# Patient Record
Sex: FEMALE | Race: WHITE | NOT HISPANIC OR LATINO | Employment: UNEMPLOYED | URBAN - METROPOLITAN AREA
[De-identification: names, ages, dates, MRNs, and addresses within clinical notes are randomized per-mention and may not be internally consistent; named-entity substitution may affect disease eponyms.]

---

## 2021-10-18 ENCOUNTER — OFFICE VISIT (OUTPATIENT)
Dept: ENDOCRINOLOGY | Facility: CLINIC | Age: 66
End: 2021-10-18

## 2021-10-18 VITALS
OXYGEN SATURATION: 98 % | SYSTOLIC BLOOD PRESSURE: 140 MMHG | DIASTOLIC BLOOD PRESSURE: 82 MMHG | HEIGHT: 68 IN | HEART RATE: 80 BPM | BODY MASS INDEX: 30.83 KG/M2 | WEIGHT: 203.4 LBS

## 2021-10-18 DIAGNOSIS — E03.9 ACQUIRED HYPOTHYROIDISM: ICD-10-CM

## 2021-10-18 DIAGNOSIS — E78.00 HYPERCHOLESTEREMIA: ICD-10-CM

## 2021-10-18 DIAGNOSIS — E11.65 UNCONTROLLED TYPE 2 DIABETES MELLITUS WITH HYPERGLYCEMIA (HCC): Primary | ICD-10-CM

## 2021-10-18 PROCEDURE — 99204 OFFICE O/P NEW MOD 45 MIN: CPT | Performed by: INTERNAL MEDICINE

## 2021-10-18 RX ORDER — MONTELUKAST SODIUM 10 MG/1
TABLET ORAL
COMMUNITY

## 2021-10-18 RX ORDER — LEVOTHYROXINE SODIUM 88 MCG
88 TABLET ORAL DAILY
Qty: 30 TABLET | Refills: 11 | Status: SHIPPED | OUTPATIENT
Start: 2021-10-18 | End: 2023-03-30

## 2021-10-18 RX ORDER — LISINOPRIL 5 MG/1
TABLET ORAL
COMMUNITY
Start: 2021-10-14 | End: 2023-03-08

## 2021-10-18 RX ORDER — FLUTICASONE PROPIONATE 50 MCG
SPRAY, SUSPENSION (ML) NASAL
COMMUNITY

## 2021-10-18 RX ORDER — ERGOCALCIFEROL 1.25 MG/1
CAPSULE ORAL
COMMUNITY
End: 2023-03-08

## 2021-10-18 RX ORDER — LEVOTHYROXINE SODIUM 75 MCG
TABLET ORAL
COMMUNITY
Start: 2021-10-12 | End: 2021-10-18 | Stop reason: DRUGHIGH

## 2021-10-18 RX ORDER — GLIMEPIRIDE 2 MG/1
2 TABLET ORAL EVERY MORNING
Qty: 30 TABLET | Refills: 11 | Status: SHIPPED | OUTPATIENT
Start: 2021-10-18 | End: 2023-03-08 | Stop reason: ALTCHOICE

## 2021-10-18 RX ORDER — METFORMIN HYDROCHLORIDE 750 MG/1
TABLET, EXTENDED RELEASE ORAL
COMMUNITY
Start: 2021-09-03 | End: 2023-03-08

## 2021-10-18 RX ORDER — ALBUTEROL SULFATE 90 UG/1
AEROSOL, METERED RESPIRATORY (INHALATION)
COMMUNITY
End: 2023-03-08

## 2021-10-18 RX ORDER — ALBUTEROL SULFATE 2.5 MG/3ML
SOLUTION RESPIRATORY (INHALATION)
COMMUNITY
End: 2023-03-08

## 2021-10-18 RX ORDER — GLIMEPIRIDE 1 MG/1
TABLET ORAL
COMMUNITY
Start: 2021-10-14 | End: 2021-10-18

## 2021-10-18 RX ORDER — METOPROLOL TARTRATE 50 MG/1
TABLET, FILM COATED ORAL
COMMUNITY
End: 2023-03-08

## 2021-10-18 RX ORDER — EZETIMIBE 10 MG/1
10 TABLET ORAL DAILY
Qty: 30 TABLET | Refills: 11 | Status: SHIPPED | OUTPATIENT
Start: 2021-10-18 | End: 2023-03-30

## 2021-10-18 RX ORDER — PANTOPRAZOLE SODIUM 40 MG/1
40 TABLET, DELAYED RELEASE ORAL DAILY PRN
COMMUNITY

## 2021-10-18 NOTE — PROGRESS NOTES
"     Office Note      Date: 10/18/2021  Patient Name: Lucie Mc  MRN: [id number]  : 1955    Chief Complaint   Patient presents with   • Diabetes   • Hyperthyroidism       History of Present Illness:   Lucie Mc is a 65 y.o. female who presents for Diabetes - AND THYROID DISEASE  SHE IS SEEN AS A NEW PATIENT TODAY.  -------------------------------------------------------  SHE IS MOST CONCERNED ABOUT HER THYROID.  SHE HAS HAD HYPOTHYROIDISM SINCE ABOUT .  IN  HER TSH WAS 4.  WHEN SHE WAS 88 MCG PER DAY. BASED UPON THAT RESULT, SHE SAYS HER DOSE WAS REDUCED TO 25 MCG PER DAY.. THEN IN September HER TSH WAS 19. THEN HER DOSE WAS INCREASED TO 50. THEN HER DOSE WAS INCREASED TO 75 A WEEK AGO.  SHE HAS BEEN BOTHERED BY FATIGUE, HAIR LOSS AND DEPRESSION.  -------------------------------------------------------------------------    SHE  WAS DIAGNOSED WITH DIABETES  ABOUT YEARS AGO.  SHE  IS ON METFORMIN AND SFU.  SHE  HAS BEEN ON METFORMIN FOR 3 YEARS . GLIMEPIRIDE  WAS  ADDED THE FIRST OF September AND  SHE HAS NOTED A LITTLE IMPROVEMENT.     Last A1c:  8 SOMETHING       Subjective      Diabetic Complications:  Eyes: No  Kidneys: No  Feet: Yes, describe: HAS SOME TINGLING  Heart: No    ASSOCIATED CONDITIONS-  OBESITY, HLP- DECLINES STATINS AND WANTS TO TAKE ZETIA, HTN    Diet and Exercise:  Meals per day: 1  Minutes of exercise per week: NOT MUCH     Review of Systems:   Review of Systems   Constitutional: Positive for fatigue and unexpected weight change.   Musculoskeletal: Positive for back pain.   Skin:        HAIR LOSS        The following portions of the patient's history were reviewed and updated as appropriate: allergies, current medications, past family history, past medical history, past social history, past surgical history and problem list.    Objective     Visit Vitals  /82 (BP Location: Left arm, Patient Position: Sitting, Cuff Size: Adult)   Pulse 80   Ht 172.7 cm (68\")   Wt " 92.3 kg (203 lb 6.4 oz)   SpO2 98%   BMI 30.93 kg/m²       Labs:    CMP  No results found for: GLUCOSE, BUN, CREATININE, EGFRIFNONA, EGFRIFAFRI, BCR, K, CO2, CALCIUM, PROTENTOTREF, LABIL2, BILIRUBIN, AST, ALT     CBC w/DIFF  No results found for: WBC, RBC, HGB, HCT, MCV, MCH, MCHC, RDW, RDWSD, MPV, PLT, NEUTRORELPCT, LYMPHORELPCT, MONORELPCT, EOSRELPCT, BASORELPCT, AUTOIGPER, NEUTROABS, LYMPHSABS, MONOSABS, EOSABS, BASOSABS, AUTOIGNUM, NRBC    Physical Exam:  Physical Exam  Vitals reviewed.   Constitutional:       Appearance: Normal appearance. She is normal weight.   HENT:      Head: Normocephalic and atraumatic.   Neck:      Comments: No goiter  Cardiovascular:      Rate and Rhythm: Normal rate and regular rhythm.      Pulses:           Dorsalis pedis pulses are 2+ on the right side and 2+ on the left side.        Posterior tibial pulses are 2+ on the right side and 2+ on the left side.   Musculoskeletal:      Right foot: Normal range of motion. No deformity, bunion, Charcot foot, foot drop or prominent metatarsal heads.      Left foot: Normal range of motion. No deformity, bunion, Charcot foot, foot drop or prominent metatarsal heads.   Feet:      Right foot:      Protective Sensation: 5 sites tested. 5 sites sensed.      Skin integrity: Skin integrity normal.      Toenail Condition: Right toenails are normal.      Left foot:      Protective Sensation: 5 sites tested. 5 sites sensed.      Skin integrity: Skin integrity normal.      Toenail Condition: Left toenails are normal.      Comments: Diabetic Foot Exam Performed and Monofilament Test Performed    Lymphadenopathy:      Cervical: No cervical adenopathy.   Skin:     General: Skin is warm.   Neurological:      Mental Status: She is alert.   Psychiatric:         Mood and Affect: Mood normal.         Thought Content: Thought content normal.         Judgment: Judgment normal.         Assessment / Plan      Assessment & Plan:  Problem List Items Addressed This  Visit        Other    Uncontrolled type 2 diabetes mellitus with hyperglycemia (HCC) - Primary    Current Assessment & Plan      Reviewed diet and exercise guidelines with her  Reviewed medication options. She wants to increase the glimepiride  While watching for wt gain and hypoglycemia.         Relevant Medications    metFORMIN ER (GLUCOPHAGE-XR) 750 MG 24 hr tablet    glimepiride (Amaryl) 2 MG tablet    Acquired hypothyroidism    Current Assessment & Plan     Based upon review of her labs, I think she needs to be on at least 88 mcg per day. Will put her back on that then recheck her tsh in 6 weeks.          Relevant Medications    metoprolol tartrate (LOPRESSOR) 50 MG tablet    Synthroid 88 MCG tablet    Other Relevant Orders    TSH    Hypercholesteremia    Current Assessment & Plan      She ought to be on a statin but declines. Agrees to zetia         Relevant Medications    ezetimibe (Zetia) 10 MG tablet           Benny Malhotra MD   10/18/2021

## 2022-10-26 DIAGNOSIS — E03.9 ACQUIRED HYPOTHYROIDISM: ICD-10-CM

## 2022-10-26 RX ORDER — LEVOTHYROXINE SODIUM 88 MCG
TABLET ORAL
Qty: 30 TABLET | Refills: 11 | OUTPATIENT
Start: 2022-10-26

## 2023-02-27 ENCOUNTER — APPOINTMENT (OUTPATIENT)
Dept: GENERAL RADIOLOGY | Facility: HOSPITAL | Age: 68
End: 2023-02-27
Payer: MEDICARE

## 2023-02-27 ENCOUNTER — HOSPITAL ENCOUNTER (EMERGENCY)
Facility: HOSPITAL | Age: 68
Discharge: HOME | End: 2023-02-27
Attending: EMERGENCY MEDICINE | Admitting: EMERGENCY MEDICINE
Payer: MEDICARE

## 2023-02-27 VITALS
HEART RATE: 89 BPM | RESPIRATION RATE: 18 BRPM | WEIGHT: 200 LBS | TEMPERATURE: 98.3 F | OXYGEN SATURATION: 92 % | SYSTOLIC BLOOD PRESSURE: 124 MMHG | BODY MASS INDEX: 30.31 KG/M2 | HEIGHT: 68 IN | DIASTOLIC BLOOD PRESSURE: 64 MMHG

## 2023-02-27 DIAGNOSIS — R00.2 PALPITATIONS: Primary | ICD-10-CM

## 2023-02-27 LAB
ALBUMIN SERPL-MCNC: 4.5 G/DL (ref 3.5–5.2)
ALBUMIN/GLOB SERPL: 1.7 G/DL
ALP SERPL-CCNC: 83 U/L (ref 39–117)
ALT SERPL W P-5'-P-CCNC: 25 U/L (ref 1–33)
ANION GAP SERPL CALCULATED.3IONS-SCNC: 11 MMOL/L (ref 5–15)
AST SERPL-CCNC: 28 U/L (ref 1–32)
BASOPHILS # BLD AUTO: 0.02 10*3/MM3 (ref 0–0.2)
BASOPHILS NFR BLD AUTO: 0.3 % (ref 0–1.5)
BILIRUB SERPL-MCNC: 0.5 MG/DL (ref 0–1.2)
BUN SERPL-MCNC: 10 MG/DL (ref 8–23)
BUN/CREAT SERPL: 19.2 (ref 7–25)
CALCIUM SPEC-SCNC: 9.8 MG/DL (ref 8.6–10.5)
CHLORIDE SERPL-SCNC: 99 MMOL/L (ref 98–107)
CO2 SERPL-SCNC: 28 MMOL/L (ref 22–29)
CREAT SERPL-MCNC: 0.52 MG/DL (ref 0.57–1)
D DIMER PPP FEU-MCNC: 0.42 MCGFEU/ML (ref 0–0.69)
DEPRECATED RDW RBC AUTO: 43.3 FL (ref 37–54)
EGFRCR SERPLBLD CKD-EPI 2021: 100.7 ML/MIN/1.73
EOSINOPHIL # BLD AUTO: 0.14 10*3/MM3 (ref 0–0.4)
EOSINOPHIL NFR BLD AUTO: 2.1 % (ref 0.3–6.2)
ERYTHROCYTE [DISTWIDTH] IN BLOOD BY AUTOMATED COUNT: 13.1 % (ref 12.3–15.4)
GLOBULIN UR ELPH-MCNC: 2.6 GM/DL
GLUCOSE SERPL-MCNC: 230 MG/DL (ref 65–99)
HCT VFR BLD AUTO: 45.7 % (ref 34–46.6)
HGB BLD-MCNC: 15.4 G/DL (ref 12–15.9)
HOLD SPECIMEN: NORMAL
IMM GRANULOCYTES # BLD AUTO: 0.05 10*3/MM3 (ref 0–0.05)
IMM GRANULOCYTES NFR BLD AUTO: 0.8 % (ref 0–0.5)
LIPASE SERPL-CCNC: 20 U/L (ref 13–60)
LYMPHOCYTES # BLD AUTO: 1.16 10*3/MM3 (ref 0.7–3.1)
LYMPHOCYTES NFR BLD AUTO: 17.5 % (ref 19.6–45.3)
MAGNESIUM SERPL-MCNC: 1.8 MG/DL (ref 1.6–2.4)
MCH RBC QN AUTO: 30.3 PG (ref 26.6–33)
MCHC RBC AUTO-ENTMCNC: 33.7 G/DL (ref 31.5–35.7)
MCV RBC AUTO: 89.8 FL (ref 79–97)
MONOCYTES # BLD AUTO: 0.49 10*3/MM3 (ref 0.1–0.9)
MONOCYTES NFR BLD AUTO: 7.4 % (ref 5–12)
NEUTROPHILS NFR BLD AUTO: 4.77 10*3/MM3 (ref 1.7–7)
NEUTROPHILS NFR BLD AUTO: 71.9 % (ref 42.7–76)
NRBC BLD AUTO-RTO: 0 /100 WBC (ref 0–0.2)
NT-PROBNP SERPL-MCNC: 106.7 PG/ML (ref 0–900)
PLATELET # BLD AUTO: 161 10*3/MM3 (ref 140–450)
PMV BLD AUTO: 11.6 FL (ref 6–12)
POTASSIUM SERPL-SCNC: 4.1 MMOL/L (ref 3.5–5.2)
PROT SERPL-MCNC: 7.1 G/DL (ref 6–8.5)
QT INTERVAL: 376 MS
QTC INTERVAL: 496 MS
RBC # BLD AUTO: 5.09 10*6/MM3 (ref 3.77–5.28)
SODIUM SERPL-SCNC: 138 MMOL/L (ref 136–145)
T4 FREE SERPL-MCNC: 1.09 NG/DL (ref 0.93–1.7)
TROPONIN T SERPL HS-MCNC: 8 NG/L
TSH SERPL DL<=0.05 MIU/L-ACNC: 1.69 UIU/ML (ref 0.27–4.2)
WBC NRBC COR # BLD: 6.63 10*3/MM3 (ref 3.4–10.8)
WHOLE BLOOD HOLD COAG: NORMAL
WHOLE BLOOD HOLD SPECIMEN: NORMAL

## 2023-02-27 PROCEDURE — 84443 ASSAY THYROID STIM HORMONE: CPT | Performed by: EMERGENCY MEDICINE

## 2023-02-27 PROCEDURE — 83690 ASSAY OF LIPASE: CPT | Performed by: EMERGENCY MEDICINE

## 2023-02-27 PROCEDURE — 80053 COMPREHEN METABOLIC PANEL: CPT | Performed by: EMERGENCY MEDICINE

## 2023-02-27 PROCEDURE — 71045 X-RAY EXAM CHEST 1 VIEW: CPT

## 2023-02-27 PROCEDURE — 85379 FIBRIN DEGRADATION QUANT: CPT | Performed by: EMERGENCY MEDICINE

## 2023-02-27 PROCEDURE — 93005 ELECTROCARDIOGRAM TRACING: CPT

## 2023-02-27 PROCEDURE — 99284 EMERGENCY DEPT VISIT MOD MDM: CPT

## 2023-02-27 PROCEDURE — 84439 ASSAY OF FREE THYROXINE: CPT | Performed by: EMERGENCY MEDICINE

## 2023-02-27 PROCEDURE — 93005 ELECTROCARDIOGRAM TRACING: CPT | Performed by: EMERGENCY MEDICINE

## 2023-02-27 PROCEDURE — 83735 ASSAY OF MAGNESIUM: CPT | Performed by: EMERGENCY MEDICINE

## 2023-02-27 PROCEDURE — 83880 ASSAY OF NATRIURETIC PEPTIDE: CPT | Performed by: EMERGENCY MEDICINE

## 2023-02-27 PROCEDURE — 85025 COMPLETE CBC W/AUTO DIFF WBC: CPT | Performed by: EMERGENCY MEDICINE

## 2023-02-27 PROCEDURE — 84484 ASSAY OF TROPONIN QUANT: CPT | Performed by: EMERGENCY MEDICINE

## 2023-02-27 RX ORDER — SPIRONOLACTONE 25 MG/1
1 TABLET ORAL DAILY
COMMUNITY
Start: 2022-12-07

## 2023-02-27 RX ORDER — ASPIRIN 81 MG/1
324 TABLET, CHEWABLE ORAL ONCE
Status: DISCONTINUED | OUTPATIENT
Start: 2023-02-27 | End: 2023-02-27 | Stop reason: HOSPADM

## 2023-02-27 RX ORDER — CARVEDILOL 6.25 MG/1
6.25 TABLET ORAL 2 TIMES DAILY WITH MEALS
COMMUNITY
Start: 2023-01-31

## 2023-02-27 RX ORDER — SACUBITRIL AND VALSARTAN 49; 51 MG/1; MG/1
1 TABLET, FILM COATED ORAL EVERY 12 HOURS SCHEDULED
COMMUNITY
Start: 2023-02-24

## 2023-02-27 RX ORDER — SODIUM CHLORIDE 0.9 % (FLUSH) 0.9 %
10 SYRINGE (ML) INJECTION AS NEEDED
Status: DISCONTINUED | OUTPATIENT
Start: 2023-02-27 | End: 2023-02-27 | Stop reason: HOSPADM

## 2023-03-08 ENCOUNTER — HOSPITAL ENCOUNTER (OUTPATIENT)
Dept: CARDIOLOGY | Facility: HOSPITAL | Age: 68
Discharge: HOME | End: 2023-03-08
Payer: MEDICARE

## 2023-03-08 ENCOUNTER — OFFICE VISIT (OUTPATIENT)
Dept: CARDIOLOGY | Facility: HOSPITAL | Age: 68
End: 2023-03-08
Payer: MEDICARE

## 2023-03-08 DIAGNOSIS — R00.2 PALPITATIONS: ICD-10-CM

## 2023-03-08 DIAGNOSIS — I50.22 CHRONIC HFREF (HEART FAILURE WITH REDUCED EJECTION FRACTION): ICD-10-CM

## 2023-03-08 DIAGNOSIS — R00.2 PALPITATIONS: Primary | ICD-10-CM

## 2023-03-08 PROCEDURE — 99203 OFFICE O/P NEW LOW 30 MIN: CPT | Performed by: NURSE PRACTITIONER

## 2023-03-08 PROCEDURE — 93246 EXT ECG>7D<15D RECORDING: CPT

## 2023-03-08 RX ORDER — GLIMEPIRIDE 4 MG/1
1 TABLET ORAL 2 TIMES DAILY
COMMUNITY
Start: 2023-03-01

## 2023-03-08 NOTE — PROGRESS NOTES
[facility] Heart Monitor Documentation    Lucie Mc  1955  [id number]  03/08/23      [] ZIO XT Patch  Model [id number] Prescribed for N/A Days    · Serial Number: (N + 9 Digits) N   · Apply-By Date on Box:   · USPS Tracking Number:   · USPS Tracking        [] [employer] MINI PLUS Mobile Cardiac Telemetry  Model BGMINIPLUS Prescribed for N/A Days    · Serial Number: (BGM + 7 Digits) BGM  · Shipped-By Date on Box:  · UPS Tracking Number: [id number]  · UPS Tracking      [] [employer] MINI Holter Monitor  Model BGMINIEL Prescribed for 7 Days    · Serial Number: (7 Digits)   · [id number] Date on Box: [id number]  · UPS Tracking Number: [id number]  · UPS Tracking        This monitor was applied to the patient's chest and checked for proper functioning.  Ms. Lucie Mc was instructed in the proper use of this monitor.  She was given the opportunity to ask questions and left the office with the device 's instruction manual.    Caroline Kilgore MA, 15:38 EST, 03/08/23                  [facility] Hospital[facility] 8.8.2019

## 2023-03-08 NOTE — PROGRESS NOTES
"Chief Complaint  Palpitations and Establish Care (/)    Subjective    History of Present Illness {CC  Problem List  Visit  Diagnosis   Encounters  Notes  Medications  Labs  Result Review Imaging  Media :23}       History of Present Illness   67 year old female presents to the office today at the request of [facility] ED for ongoing evaluation of her palpitations. Patient is followed by Dr Boyd. She notes that her echo in December showed a normalized ef of 50%, previously had been 25-30%. Reports that palpitations have been present for the past few weeks.  notes that her pcp recently decreased her coreg down to 6.25 mg bid due to hrs in the 40s.  Currently denies chest pain, palpitations, presyncope or syncope.   Presents with home heart rate and blood pressure monitor showing heart rates 40-87 and blood pressure 111/61 to 136/76  Vital Signs:   Vitals:    03/08/23 1459 03/08/23 1502 03/08/23 1503 03/08/23 1505   BP: 177/75 165/68 160/69 128/78   BP Location: Right arm Left arm Left arm Left arm   Patient Position: Sitting Standing Sitting Sitting   Cuff Size: Adult Adult Adult    Pulse: 81 92 86    Resp:   18    Temp: 97.2 °F (36.2 °C) 97.2 °F (36.2 °C) 97.2 °F (36.2 °C)    TempSrc: Temporal Temporal Temporal    SpO2: 95% 99% 97%    Weight:   93.3 kg (205 lb 9.6 oz)    Height:   172.7 cm (68\")      Body mass index is 31.26 kg/m².  Physical Exam  Vitals and nursing note reviewed.   Constitutional:       Appearance: Normal appearance.   HENT:      Head: Normocephalic.   Eyes:      Pupils: Pupils are equal, round, and reactive to light.   Cardiovascular:      Rate and Rhythm: Normal rate and regular rhythm.      Pulses: Normal pulses.      Heart sounds: Normal heart sounds. No murmur heard.  Pulmonary:      Effort: Pulmonary effort is normal.      Breath sounds: Normal breath sounds.   Abdominal:      General: Bowel sounds are normal.      Palpations: Abdomen is soft.   Musculoskeletal:         General: Normal " range of motion.      Cervical back: Normal range of motion.      Right lower leg: No edema.      Left lower leg: No edema.   Skin:     General: Skin is warm and dry.      Capillary Refill: Capillary refill takes less than 2 seconds.   Neurological:      Mental Status: She is alert and oriented to person, place, and time.   Psychiatric:         Mood and Affect: Mood normal.         Thought Content: Thought content normal.              Result Review  Data Reviewed:{ Labs  Result Review  Imaging  Med Tab  Media :23}     ECG 12 Lead ED Triage Standing Order; Chest Pain (02/27/2023 09:18)  T4, Free (02/27/2023 09:33)  TSH (02/27/2023 09:33)  Magnesium (02/27/2023 09:33)  D-dimer, Quantitative (02/27/2023 09:33)  BNP (02/27/2023 09:33)  Lipase (02/27/2023 09:33)  Comprehensive Metabolic Panel (02/27/2023 09:33)  CBC & Differential (02/27/2023 09:33)  High Sensitivity Troponin T (02/27/2023 09:33)           Assessment and Plan {CC Problem List  Visit Diagnosis  ROS  Review (Popup)  Health Maintenance  Quality  BestPractice  Medications  SmartSets  SnapShot Encounters  Media :23}   1. Palpitations    - Holter Monitor - 72 Hour Up To 7 Days; Future    2. Chronic HFrEF (heart failure with reduced ejection fraction) (HCC)  With normalized ef         Follow Up {Instructions Charge Capture  Follow-up Communications :23}   Return in about 3 weeks (around 3/29/2023) for Telemedicine visit, Monitor results.    Patient was given instructions and counseling regarding her condition or for health maintenance advice. Please see specific information pulled into the AVS if appropriate.  Patient was instructed to call the [facility] with any questions, concerns, or worsening symptoms.

## 2023-03-09 VITALS
DIASTOLIC BLOOD PRESSURE: 78 MMHG | OXYGEN SATURATION: 97 % | SYSTOLIC BLOOD PRESSURE: 128 MMHG | HEIGHT: 68 IN | TEMPERATURE: 97.2 F | RESPIRATION RATE: 18 BRPM | WEIGHT: 205.6 LBS | BODY MASS INDEX: 31.16 KG/M2 | HEART RATE: 86 BPM

## 2023-03-30 ENCOUNTER — TELEMEDICINE (OUTPATIENT)
Dept: CARDIOLOGY | Facility: HOSPITAL | Age: 68
End: 2023-03-30
Payer: MEDICARE

## 2023-03-30 VITALS
HEIGHT: 68 IN | WEIGHT: 205 LBS | SYSTOLIC BLOOD PRESSURE: 128 MMHG | DIASTOLIC BLOOD PRESSURE: 74 MMHG | BODY MASS INDEX: 31.07 KG/M2 | OXYGEN SATURATION: 93 %

## 2023-03-30 DIAGNOSIS — I50.22 CHRONIC HFREF (HEART FAILURE WITH REDUCED EJECTION FRACTION): ICD-10-CM

## 2023-03-30 DIAGNOSIS — I47.29 NSVT (NONSUSTAINED VENTRICULAR TACHYCARDIA): ICD-10-CM

## 2023-03-30 DIAGNOSIS — R00.2 PALPITATIONS: Primary | ICD-10-CM

## 2023-03-30 NOTE — PROGRESS NOTES
"Chief Complaint  Follow-up (Palpitations )    Subjective    History of Present Illness {CC  Problem List  Visit  Diagnosis   Encounters  Notes  Medications  Labs  Result Review Imaging  Media :23}     You have chosen to receive care through the use of telemedicine. Telemedicine enables health care providers at different locations to provide safe, effective, and convenient care through the use of technology. As with any health care service, there are risks associated with the use of telemedicine, including equipment failure, poor connections, and  issues.    • Do you understand the risks and benefits of telemedicine as I have explained them to you? Yes  • Have your questions regarding telemedicine been answered? Yes  • Do you consent to the use of telemedicine in your medical care today? Yes    History of Present Illness   67 year old female presents for telehealth visit  today at the request of [facility] ED for ongoing evaluation of her palpitations. Patient is followed by Dr Boyd. She notes that her echo in December showed a normalized ef of 50%, previously had been 25-30%. Reports that palpitations have been present for the past few weeks.  Notes that her pcp recently decreased her coreg down to 6.25 mg bid due to hrs in the 40s.  Currently denies chest pain, dyspnea,  presyncope or syncope. She reports that her palpitations have decreased in frequency and she notes that she is feeling better overall. Patient notes that she has not been seeing her heart rates in the 40s since her pcp decreased her coreg to 6.25 mg bid   Objective     Vital Signs:   Vitals:    03/30/23 0857   BP: 128/74   BP Location: Left arm   Patient Position: Sitting   SpO2: 93%   Weight: 93 kg (205 lb)   Height: 172.7 cm (68\")     Body mass index is 31.17 kg/m².  Physical Exam  Vitals and nursing note reviewed.   Constitutional:       Appearance: Normal appearance.   Pulmonary:      Effort: Pulmonary effort is " normal.   Neurological:      Mental Status: She is alert and oriented to person, place, and time.   Psychiatric:         Mood and Affect: Mood normal.         Behavior: Behavior normal.         Thought Content: Thought content normal.              Result Review  Data Reviewed:{ Labs  Result Review  Imaging  Med Tab  Media :23}     Extended Holter worn for 7 days showed an average heart rate of 76 bpm, PAC burden 0.08%, PVC burden 0.02% 7 runs of atrial tachycardia, patient asymptomatic  1 run of nonsustained being VT lasting 54 beats (patient reports she was asymptomatic at that time)           Assessment and Plan {CC Problem List  Visit Diagnosis  ROS  Review (Popup)  Health Maintenance  Quality  BestPractice  Medications  SmartSets  SnapShot Encounters  Media :23}   1. Palpitations  Low burden on recent extended holter  Continue coreg     2. Chronic HFrEF (heart failure with reduced ejection fraction) ([facility])  Normalized ef  Continue coreg, entresto  3. NSVT (nonsustained ventricular tachycardia) ([facility])  holter to be sent to Dr Boyd for review  appt will be made with Dr Boyd           Follow Up {Instructions Charge Capture  Follow-up Communications :23}   Return if symptoms worsen or fail to improve.    Patient was given instructions and counseling regarding her condition or for health maintenance advice. Please see specific information pulled into the AVS if appropriate.  Patient was instructed to call the [facility] with any questions, concerns, or worsening symptoms.

## 2023-11-27 NOTE — ASSESSMENT & PLAN NOTE
Reviewed diet and exercise guidelines with her  Reviewed medication options. She wants to increase the glimepiride  While watching for wt gain and hypoglycemia.  
 She ought to be on a statin but declines. Agrees to zetia  
Based upon review of her labs, I think she needs to be on at least 88 mcg per day. Will put her back on that then recheck her tsh in 6 weeks.   
Yes
[id number]

## 2024-05-08 ENCOUNTER — OFFICE (OUTPATIENT)
Dept: URBAN - METROPOLITAN AREA PATHOLOGY 4 | Facility: PATHOLOGY | Age: 69
End: 2024-05-08

## 2024-05-08 ENCOUNTER — AMBULATORY SURGICAL CENTER (OUTPATIENT)
Dept: URBAN - METROPOLITAN AREA SURGERY 10 | Facility: SURGERY | Age: 69
End: 2024-05-08

## 2024-05-08 DIAGNOSIS — Z12.11 ENCOUNTER FOR SCREENING FOR MALIGNANT NEOPLASM OF COLON: ICD-10-CM

## 2024-05-08 DIAGNOSIS — R19.5 OTHER FECAL ABNORMALITIES: ICD-10-CM

## 2024-05-08 DIAGNOSIS — C18.2 MALIGNANT NEOPLASM OF ASCENDING COLON: ICD-10-CM

## 2024-05-08 DIAGNOSIS — K57.90 DIVERTICULOSIS OF INTESTINE, PART UNSPECIFIED, WITHOUT PERFO: ICD-10-CM

## 2024-05-08 DIAGNOSIS — D12.4 BENIGN NEOPLASM OF DESCENDING COLON: ICD-10-CM

## 2024-05-08 DIAGNOSIS — K64.1 SECOND DEGREE HEMORRHOIDS: ICD-10-CM

## 2024-05-08 PROCEDURE — 88341 IMHCHEM/IMCYTCHM EA ADD ANTB: CPT | Performed by: PATHOLOGY

## 2024-05-08 PROCEDURE — 88342 IMHCHEM/IMCYTCHM 1ST ANTB: CPT | Performed by: PATHOLOGY

## 2024-05-08 PROCEDURE — 88305 TISSUE EXAM BY PATHOLOGIST: CPT | Performed by: PATHOLOGY

## 2024-05-08 PROCEDURE — 45381 COLONOSCOPY SUBMUCOUS NJX: CPT | Mod: KX,PT | Performed by: INTERNAL MEDICINE

## 2024-05-08 PROCEDURE — 45385 COLONOSCOPY W/LESION REMOVAL: CPT | Mod: KX,PT | Performed by: INTERNAL MEDICINE

## 2024-05-08 PROCEDURE — 45380 COLONOSCOPY AND BIOPSY: CPT | Mod: 59,KX,PT | Performed by: INTERNAL MEDICINE
